# Patient Record
Sex: MALE | Race: WHITE | Employment: OTHER | URBAN - METROPOLITAN AREA
[De-identification: names, ages, dates, MRNs, and addresses within clinical notes are randomized per-mention and may not be internally consistent; named-entity substitution may affect disease eponyms.]

---

## 2017-03-22 ENCOUNTER — HOSPITAL ENCOUNTER (OUTPATIENT)
Dept: MRI IMAGING | Age: 82
Discharge: HOME | End: 2017-03-22
Attending: PHYSICAL MEDICINE & REHABILITATION
Payer: MEDICARE

## 2017-03-22 ENCOUNTER — HOSPITAL ENCOUNTER (OUTPATIENT)
Dept: GENERAL RADIOLOGY | Age: 82
Discharge: HOME | End: 2017-03-22
Attending: PHYSICAL MEDICINE & REHABILITATION
Payer: MEDICARE

## 2017-03-22 DIAGNOSIS — M48.061 LUMBAR STENOSIS: ICD-10-CM

## 2017-03-22 DIAGNOSIS — Z98.890 HISTORY OF SURGERY: ICD-10-CM

## 2017-03-22 DIAGNOSIS — M48.02 CERVICAL SPINAL STENOSIS: ICD-10-CM

## 2017-03-22 DIAGNOSIS — M48.061 LUMBAR SPINAL STENOSIS: ICD-10-CM

## 2017-03-22 LAB — CREAT BLD-MCNC: 0.8 MG/DL (ref 0.6–1.3)

## 2017-03-22 PROCEDURE — A9585 GADOBUTROL INJECTION: HCPCS | Performed by: PHYSICAL MEDICINE & REHABILITATION

## 2017-03-22 PROCEDURE — 72100 X-RAY EXAM L-S SPINE 2/3 VWS: CPT

## 2017-03-22 PROCEDURE — 72158 MRI LUMBAR SPINE W/O & W/DYE: CPT

## 2017-03-22 PROCEDURE — 82565 ASSAY OF CREATININE: CPT

## 2017-03-22 PROCEDURE — 72156 MRI NECK SPINE W/O & W/DYE: CPT

## 2017-03-22 PROCEDURE — 74011250636 HC RX REV CODE- 250/636: Performed by: PHYSICAL MEDICINE & REHABILITATION

## 2017-03-22 RX ADMIN — GADOBUTROL 8 ML: 604.72 INJECTION INTRAVENOUS at 16:19

## 2020-02-17 ENCOUNTER — OFFICE VISIT (OUTPATIENT)
Dept: RHEUMATOLOGY | Age: 85
End: 2020-02-17

## 2020-02-17 VITALS
SYSTOLIC BLOOD PRESSURE: 131 MMHG | BODY MASS INDEX: 29.19 KG/M2 | OXYGEN SATURATION: 97 % | WEIGHT: 186 LBS | DIASTOLIC BLOOD PRESSURE: 68 MMHG | TEMPERATURE: 97.5 F | HEIGHT: 67 IN | HEART RATE: 56 BPM | RESPIRATION RATE: 20 BRPM

## 2020-02-17 DIAGNOSIS — M19.042 PRIMARY OSTEOARTHRITIS OF BOTH HANDS: Primary | ICD-10-CM

## 2020-02-17 DIAGNOSIS — M19.041 PRIMARY OSTEOARTHRITIS OF BOTH HANDS: Primary | ICD-10-CM

## 2020-02-17 DIAGNOSIS — L40.9 PSORIASIS: ICD-10-CM

## 2020-02-17 DIAGNOSIS — M17.0 PRIMARY OSTEOARTHRITIS OF BOTH KNEES: ICD-10-CM

## 2020-02-17 RX ORDER — DICLOFENAC SODIUM 10 MG/G
2 GEL TOPICAL 4 TIMES DAILY
Qty: 1 EACH | Refills: 5 | Status: SHIPPED | OUTPATIENT
Start: 2020-02-17

## 2020-02-17 NOTE — PROGRESS NOTES
REASON FOR VISIT    This is the initial evaluation for Mr. Raffy Dominguez a 80 y.o.  male for question of joint apins. The patient is referred to the [facility] at the request of PCP. HISTORY OF PRESENT ILLNESS     Previous medical records reviewed and summarized: yes    MHAQ:2.25  Pain scale: 6/10    This is a 80 y.o. male. The patient notes that his hands are closing up and they hurt. They have been hurting for months. They are getting worse. He takes lyrica and it doesn't help much. His hands are very stiff all the time and they hurt with using them. He thinks his hands are swollen. He has deformities in his hands for years. Other joints are okay but toes are similar. He is not sure if anyone in his family had similar looking hands. REVIEW OF SYSTEMS    A 15 point review of systems was performed and summarized below. The questionnaire was reviewed with the patient and scanned into the patient's medical record.     General: denies recent weight gain, recent weight loss, fatigue, weakness, fever, drenching night sweats  Musculoskeletal: endorses joint pain, joint swelling, morning stiffness (lasting 20 minutes), muscle pain  denies   Ears: endorses hearing loss,denies ringing in ears, deafness  Eyes: endorses pain, light sensitive, redness,  double vision, blurred vision, excess tearing, dryness, foreign body sensation  denies  Blindness,  Mouth: denies sore tongue, oral ulcers, loss of taste, dryness, increased dental caries  Nose: denies nosebleeds, nasal ulcers  Throat: endorses food stuck when swallowing, difficulty with swallowing,denies  hoarseness, pain in jaw while chewing  Neck: denies swollen glands, tender glands  Cardiopulmonary: endorses pain in chest with deep breaths, shortness of breath at rest, shortness of breath on exertion, denies  pain in chest when lying down, murmurs, sudden changes in heart beat, wheezing, dry cough, productive cough, coughing of blood  Gastrointestinal: endorses nausea,denies  heartburn, stomach pain relieved by food, chronic constipation, chronic diarrhea, blood in stools, black stools  Genitourinary: denies vaginal dryness, pain or burning on urination, blood in urine, cloudy urine, vaginal ulcers, penile ulcers  Hematologic: endorses anemia,denies  bleeding tendency, blood clots, bleeding gums  Skin: endorses easy bruising, hair loss, denies rash, rash worsened after sun exposure, hives/urticaria, skin thickening, skin tightness, nodules/bumps, color changes of hands or feet in the cold (Raynaud's)  Neurologic: endorses numbness or tingling in hands, numbness or tingling in feet, muscle weakness   denies   Psychiatric: denies depression, excessive worries, PTSD, Bipolar  Sleep: denies poor sleep (4 hours), denies snoring, apnea, daytime somnolence, difficulty falling asleep, difficulty staying asleep     PAST MEDICAL HISTORY    Past Medical History:   Diagnosis Date    Arrhythmia     A Fib    Arthritis     \"everywhere\"    CAD (coronary artery disease)     Diabetes ([facility] [address]     Hypertension         Past Surgical History:   Procedure Laterality Date    CARDIAC SURG PROCEDURE UNLIST          HX ORTHOPAEDIC      Fusion and rods       FAMILY HISTORY    Family History   Problem Relation Age of Onset    Diabetes Mother     Diabetes Sister     Diabetes Brother     Heart Disease Brother     Diabetes Brother        SOCIAL HISTORY    Social History     Tobacco Use    Smoking status: Former Smoker     Last attempt to quit: 1987     Years since quittin.6    Smokeless tobacco: Never Used   Substance Use Topics    Alcohol use: Yes     Alcohol/week: 0.8 standard drinks     Types: 1 Standard drinks or equivalent per week    Drug use: No       MEDICATIONS    Current Outpatient Medications   Medication Sig Dispense Refill    diclofenac (VOLTAREN) 1 % gel Apply 2 g to affected area four (4) times daily.  1 Each 5    potassium chloride (KLOR-CON M20) 20 mEq tablet Take 20 mEq by mouth two (2) times a day.  OMEPRAZOLE PO Take 20 mg by mouth daily.  amlodipine (NORVASC) 5 mg tablet Take 5 mg by mouth daily.  atenolol (TENORMIN) 25 mg tablet Take 25 mg by mouth daily.  glimepiride (AMARYL) 4 mg tablet Take 4 mg by mouth every morning.  furosemide (LASIX) 40 mg tablet Take  by mouth daily.  simvastatin (ZOCOR) 80 mg tablet Take 80 mg by mouth nightly.  pregabalin (LYRICA) 100 mg capsule Take 100 mg by mouth two (2) times a day.  cyclobenzaprine (FLEXERIL) 10 mg tablet Take 5 mg by mouth nightly.  metformin (GLUCOPHAGE) 1,000 mg tablet Take 1,000 mg by mouth two (2) times daily (with meals). ALLERGIES    Allergies   Allergen Reactions    Valium [Diazepam] Drowsiness    Tramadol Rash       PHYSICAL EXAMINATION    Visit Vitals  /68 (BP 1 Location: Right arm, BP Patient Position: Sitting)   Pulse (!) 56   Temp 97.5 °F (36.4 °C) (Oral)   Resp 20   Ht 5' 7\" (1.702 m)   Wt 186 lb (84.4 kg)   SpO2 97%   BMI 29.13 kg/m²     Body mass index is 29.13 kg/m². General: NAD  HEENT: PERRL, anicteric, non-injected sclerae; oropharynx without ulcers, erythema, or exudate. Moist mucous membranes. Lymphatic: No cervical or axillary lymphadenopathy. Cardiovascular: S1, S2,no R/M/G  Pulmonary: CTA b/l. No wheezes/rales/rhonchi. Abdominal: Soft,NTND, + BS. Skin: psoriatic plaques on elbows  Neuro: Alert; able to carry normal conversation    Musculoskeletal:   Very extensive bouchards and heberdens nodes  B/l CMC squaring  Hands with flexion contractures at MCPs and PIP joints  B/l ankles with pitting edema  B/l elbows with flexion contractures    DATA REVIEW    Prior medical records were reviewed and if applicable are summarized as below:    Labs:   N/A    Imaging:   N/A    ASSESSMENT AND PLAN    A 80 y.o. male with hx of HTN, HLD, diabetes presents for evaluation of hand pain.  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 He also has psoriatic plaques and b/l elbow contractures which can be concerning for inflammatory arthritis such as psoriatic arthritis. # Hand osteoarthritis:  - b/l hand x-rays  - voltaren gel prescribed  - advised to use tylenol for pain control  - advised to use heat and to exercise hands    # Psoriasis:   - RF, CCP, ESR, CRP  - b/l elbow x-rays    # Med toxicity:  - cbc, cmp    # DM:   - on oral medications    RTC in 4 weeks    The patient voiced understanding of the aforementioned assessment and plan. Summary of plan was provided in the After Visit Summary patient instructions. I also provided education about MyChart setup and utility. Mr. Gregoria Linda has a reminder for a \"due or due soon\" health maintenance. I have asked that he contact his primary care provider for follow-up on this health maintenance.     TODAY'S ORDERS    Orders Placed This Encounter    XR HAND RT MIN 3 V    XR HAND LT MIN 3 V    XR ELBOW RT MIN 3 V    XR ELBOW LT MIN 3 V    CBC WITH AUTOMATED DIFF    CYCLIC CITRUL PEPTIDE AB, IGG    C REACTIVE PROTEIN, QT    SED RATE (ESR)    METABOLIC PANEL, COMPREHENSIVE    RHEUMATOID FACTOR, QL    diclofenac (VOLTAREN) 1 % gel       Future Appointments   Date Time Provider Arslan Clari   3/30/2020  1:00 PM Niya Keys  Megan [address], MD    Adult Rheumatology   [facility]  A Part of [facility] ([place], [address] [place] [address] Road   Phone [phone]  Fax [phone]

## 2020-02-17 NOTE — PATIENT INSTRUCTIONS
Please fill out your [address] you will receive after your visit in the mail or via [address] HAND OSTEOARTHRITIS. Osteoarthritis is also known as \"wear and tear arthritis,\" mechanical arthritis, or non-inflammatory arthritis. There are hereditary and vocational components and post-traumatic joint injuries may also predispose to it. It is not Rheumatoid Arthritis, however, patients with Rheumatoid Arthritis may also have osteoarthritis. I recommend: 
 
(1) Non-medicinal modalities such as keeping hands warm, avoid activities that case pain, warm water soaks 
 
(2) Oral medications, such as acetaminophen as first line (3000 mg maximum per day) and oral NSAIDs, such as naproxen (Aleve) two tablets of 220 mg twice daily with food, OR ibuprofen (Advil) two tablets of 200 mg three times daily, with food as second line therapy. Naproxen (Aleve) is associated with lower cardiovascular risk than other NSAIDs (Yasir GIFFORD, Jacqueline SM. Clinical Pharmacology and Cardiovascular Safety of Naproxen. Am J Cardiovasc Drugs. 2016 Nov 8). NSAIDs should not be used in patients on blood thinners, chronic kidney disease, high risk coronary artery disease, and inflammatory bowel disease (ulcerative colitis or Crohn's disease) 
 
(3) Topical medications, such as Capsaicin (which may cause a burning sensation) or NSAIDs, such as diclofenac gel The combination of acetaminophen and NSAIDs are safe together. Please do not combine NSAIDs together, such as Aleve, Advil and Mobic (meloxicam) Please do hand ball squeezing and holding until your hand fatigues and then alternate to the next hand. Do that 10 times twice daily

## 2020-02-18 LAB
ALBUMIN SERPL-MCNC: 4.3 G/DL (ref 3.5–4.6)
ALBUMIN/GLOB SERPL: 2 {RATIO} (ref 1.2–2.2)
ALP SERPL-CCNC: 116 IU/L (ref 39–117)
ALT SERPL-CCNC: 11 IU/L (ref 0–44)
AST SERPL-CCNC: 12 IU/L (ref 0–40)
BASOPHILS # BLD AUTO: 0 X10E3/UL (ref 0–0.2)
BASOPHILS NFR BLD AUTO: 0 %
BILIRUB SERPL-MCNC: 0.9 MG/DL (ref 0–1.2)
BUN SERPL-MCNC: 9 MG/DL (ref 10–36)
BUN/CREAT SERPL: 10 (ref 10–24)
CALCIUM SERPL-MCNC: 9.1 MG/DL (ref 8.6–10.2)
CCP IGA+IGG SERPL IA-ACNC: 8 UNITS (ref 0–19)
CHLORIDE SERPL-SCNC: 97 MMOL/L (ref 96–106)
CO2 SERPL-SCNC: 27 MMOL/L (ref 20–29)
CREAT SERPL-MCNC: 0.87 MG/DL (ref 0.76–1.27)
CRP SERPL-MCNC: 5 MG/L (ref 0–10)
EOSINOPHIL # BLD AUTO: 0.1 X10E3/UL (ref 0–0.4)
EOSINOPHIL NFR BLD AUTO: 3 %
ERYTHROCYTE [DISTWIDTH] IN BLOOD BY AUTOMATED COUNT: 12.5 % (ref 11.6–15.4)
ERYTHROCYTE [SEDIMENTATION RATE] IN BLOOD BY WESTERGREN METHOD: 8 MM/HR (ref 0–30)
GLOBULIN SER CALC-MCNC: 2.1 G/DL (ref 1.5–4.5)
GLUCOSE SERPL-MCNC: 121 MG/DL (ref 65–99)
HCT VFR BLD AUTO: 32.6 % (ref 37.5–51)
HGB BLD-MCNC: 11.2 G/DL (ref 13–17.7)
IMM GRANULOCYTES # BLD AUTO: 0 X10E3/UL (ref 0–0.1)
IMM GRANULOCYTES NFR BLD AUTO: 1 %
LYMPHOCYTES # BLD AUTO: 0.9 X10E3/UL (ref 0.7–3.1)
LYMPHOCYTES NFR BLD AUTO: 27 %
MCH RBC QN AUTO: 32.3 PG (ref 26.6–33)
MCHC RBC AUTO-ENTMCNC: 34.4 G/DL (ref 31.5–35.7)
MCV RBC AUTO: 94 FL (ref 79–97)
MONOCYTES # BLD AUTO: 0.7 X10E3/UL (ref 0.1–0.9)
MONOCYTES NFR BLD AUTO: 22 %
MORPHOLOGY BLD-IMP: ABNORMAL
NEUTROPHILS # BLD AUTO: 1.5 X10E3/UL (ref 1.4–7)
NEUTROPHILS NFR BLD AUTO: 47 %
PLATELET # BLD AUTO: 94 X10E3/UL (ref 150–450)
POTASSIUM SERPL-SCNC: 4 MMOL/L (ref 3.5–5.2)
PROT SERPL-MCNC: 6.4 G/DL (ref 6–8.5)
RBC # BLD AUTO: 3.47 X10E6/UL (ref 4.14–5.8)
RHEUMATOID FACT SERPL-ACNC: 17.4 IU/ML (ref 0–13.9)
SODIUM SERPL-SCNC: 138 MMOL/L (ref 134–144)
WBC # BLD AUTO: 3.3 X10E3/UL (ref 3.4–10.8)